# Patient Record
Sex: MALE | Race: WHITE
[De-identification: names, ages, dates, MRNs, and addresses within clinical notes are randomized per-mention and may not be internally consistent; named-entity substitution may affect disease eponyms.]

---

## 2023-05-10 LAB
BUN BLD-MCNC: 20 MG/DL (ref 7–18)
GLUCOSE SERPLBLD-MCNC: 110 MG/DL (ref 74–106)
POTASSIUM SERPL-SCNC: 4.8 MEQ/L (ref 3.5–5.1)

## 2023-05-11 ENCOUNTER — HOSPITAL ENCOUNTER (OUTPATIENT)
Dept: HOSPITAL 97 - OR | Age: 78
Discharge: HOME | End: 2023-05-11
Attending: SURGERY
Payer: COMMERCIAL

## 2023-05-11 VITALS — DIASTOLIC BLOOD PRESSURE: 85 MMHG | SYSTOLIC BLOOD PRESSURE: 205 MMHG

## 2023-05-11 VITALS — OXYGEN SATURATION: 98 % | TEMPERATURE: 97.3 F

## 2023-05-11 DIAGNOSIS — E78.00: ICD-10-CM

## 2023-05-11 DIAGNOSIS — G47.33: ICD-10-CM

## 2023-05-11 DIAGNOSIS — K42.0: Primary | ICD-10-CM

## 2023-05-11 DIAGNOSIS — K21.9: ICD-10-CM

## 2023-05-11 PROCEDURE — 80048 BASIC METABOLIC PNL TOTAL CA: CPT

## 2023-05-11 PROCEDURE — 36415 COLL VENOUS BLD VENIPUNCTURE: CPT

## 2023-05-11 PROCEDURE — 0WUF4JZ SUPPLEMENT ABDOMINAL WALL WITH SYNTHETIC SUBSTITUTE, PERCUTANEOUS ENDOSCOPIC APPROACH: ICD-10-PCS

## 2023-05-11 RX ADMIN — HYDROMORPHONE HYDROCHLORIDE ONE MG: 1 INJECTION, SOLUTION INTRAMUSCULAR; INTRAVENOUS; SUBCUTANEOUS at 12:45

## 2023-05-11 RX ADMIN — HYDROMORPHONE HYDROCHLORIDE ONE MG: 1 INJECTION, SOLUTION INTRAMUSCULAR; INTRAVENOUS; SUBCUTANEOUS at 12:40

## 2023-05-11 RX ADMIN — HYDROMORPHONE HYDROCHLORIDE ONE MG: 1 INJECTION, SOLUTION INTRAMUSCULAR; INTRAVENOUS; SUBCUTANEOUS at 12:35

## 2023-05-11 RX ADMIN — HYDROMORPHONE HYDROCHLORIDE ONE MG: 1 INJECTION, SOLUTION INTRAMUSCULAR; INTRAVENOUS; SUBCUTANEOUS at 12:30

## 2023-05-11 NOTE — P.OP
Preoperative diagnosis: Umbilical + Periumbilical Hernias


Postoperative diagnosis: Umbilical + Periumbilical Hernias


Primary procedure: Laparoscopic Ventral Hernia Repair with mesh


Anesthesia: GETA + Local


Estimated blood loss: <5cc


Specimen: None


Findings: multiple small hernias, containing omentum


Complications: None


Implants: Bard Ventralite ST mesh, Sorbafix tacks x 45


Transferred to: Recovery Room


Condition: Good

## 2023-05-11 NOTE — OP
Date of Procedure:  05/11/2023



Surgeon:  Jonathan Damian MD, MD



Preoperative Diagnosis:  Umbilical plus periumbilical hernias.



Postoperative Diagnosis:  Umbilical plus periumbilical hernias.



Procedure Performed:  Laparoscopic ventral hernia repair with mesh.



Anesthesia:  General endotracheal plus local with 0.25% Marcaine.



Estimated Blood Loss:  Less than 5 cc.



Specimen:  None.



Findings:  Multiple small ventral hernias containing omentum entrapped and incarcerated within, requi
ring adhesiolysis.



Complications:  None.



Implants:  Bard Ventralight ST mesh with Echo positioning system 11.4 cm round mesh utilized, SorbaFi
x absorbable fixation tacks, 45 tacks utilized.



Disposition:  The patient was transferred to the recovery room in good condition.



Procedure In Detail:  After informed consent was obtained, patient was prepped and draped in the usua
l sterile fashion after adequate anesthesia was achieved.  The area of the left lower quadrant was an
esthetized with 0.25% Marcaine, sharply incised.  A 5 mm trocar was placed under direct visualization
 without evidence of complication.  Insufflation was obtained with 15 mmHg at this time.  There was n
o injury to vital structure upon entry into the abdomen.  Additional trocar was placed in the left lo
wer quadrant, was anesthetized with 0.25% Marcaine, and sharply incised and a 12 mm trocar placed und
er direct visualization without evidence of complication.  At this point, the LigaSure device was use
d to take down omentum of the anterior abdominal wall which was contained within an umbilical hernia 
as well as a periumbilical hernia which was approximately 2 cm away from the hernia defect.  These he
rnia defects were approximately 1.5 to 2 cm in size at approximately 5 cm away from each other at the
 edges.  After the area was inspected and the hernia was sized appropriately, I swept back preperiton
eal fat using the LigaSure device until a good peritoneal and fascial lining was appreciated.  I then
 brought the Endo Stitch with V-Loc suture and suture closed the defects imbricating the hernia sac a
t this point with good approximation of tissues.  At this point, the 11.4 cm Bard Ventralight ST mesh
 with Echo positioning System was deployed in the central portion of the defect allowing for coverage
 with a minimum of 5 cm of underlay on both areas of the hernia defect.  At this point, the SorbaFix 
absorbable fixation tacks were used to place a single crown of the anterior abdominal wall.  The ball
oon deployment system was removed, found to be intact on back table.  A second crown was then applied
 to the anterior abdominal wall securing the mesh to the anterior abdominal wall.  A total of 45 tack
s were used with good mesh approximation to the anterior abdominal wall.  After this was performed, t
he areas were inspected.  No hemostatic measures were required.  I then closed the 12 mm trocar site 
using a Norris-Miranda suture passer with an 0 Vicryl in interrupted fashion, good approximation of 
tissues.  The abdomen was then completely desufflated under direct visualization without evidence of 
complication.  Remainder of trocars were removed.  All skin edges were copiously irrigated, closed wi
th 4-0 Monocryl in a running fashion.  Dermabond was placed over top.  The patient tolerated the proc
edure without evidence of any complication and transferred to PACU in good condition.  All counts wer
e correct at the end of the case.





KARUNA/CANDY

DD:  05/11/2023 12:20:58Voice ID:  [id number]

DT:  05/11/2023 13:39:51Report ID:  [id number]